# Patient Record
Sex: MALE | Race: WHITE | NOT HISPANIC OR LATINO | Employment: OTHER | URBAN - METROPOLITAN AREA
[De-identification: names, ages, dates, MRNs, and addresses within clinical notes are randomized per-mention and may not be internally consistent; named-entity substitution may affect disease eponyms.]

---

## 2021-12-14 ENCOUNTER — OFFICE VISIT (OUTPATIENT)
Dept: URGENT CARE | Facility: CLINIC | Age: 61
End: 2021-12-14
Payer: MEDICARE

## 2021-12-14 VITALS
SYSTOLIC BLOOD PRESSURE: 124 MMHG | TEMPERATURE: 98.2 F | BODY MASS INDEX: 31.5 KG/M2 | DIASTOLIC BLOOD PRESSURE: 84 MMHG | HEIGHT: 70 IN | HEART RATE: 78 BPM | WEIGHT: 220 LBS | RESPIRATION RATE: 18 BRPM | OXYGEN SATURATION: 98 %

## 2021-12-14 DIAGNOSIS — K04.7 DENTAL ABSCESS: Primary | ICD-10-CM

## 2021-12-14 PROCEDURE — 99213 OFFICE O/P EST LOW 20 MIN: CPT | Performed by: PHYSICIAN ASSISTANT

## 2021-12-14 PROCEDURE — G0463 HOSPITAL OUTPT CLINIC VISIT: HCPCS | Performed by: PHYSICIAN ASSISTANT

## 2021-12-14 RX ORDER — AMOXICILLIN AND CLAVULANATE POTASSIUM 875; 125 MG/1; MG/1
1 TABLET, FILM COATED ORAL EVERY 12 HOURS SCHEDULED
Qty: 14 TABLET | Refills: 0 | Status: SHIPPED | OUTPATIENT
Start: 2021-12-14 | End: 2021-12-21

## 2021-12-14 RX ORDER — IPRATROPIUM BROMIDE 17 UG/1
AEROSOL, METERED RESPIRATORY (INHALATION)
COMMUNITY
Start: 2021-10-28

## 2021-12-14 RX ORDER — ALBUTEROL SULFATE 90 UG/1
AEROSOL, METERED RESPIRATORY (INHALATION)
COMMUNITY
Start: 2021-10-27

## 2022-06-30 ENCOUNTER — OFFICE VISIT (OUTPATIENT)
Dept: GASTROENTEROLOGY | Facility: CLINIC | Age: 62
End: 2022-06-30
Payer: MEDICARE

## 2022-06-30 ENCOUNTER — TELEPHONE (OUTPATIENT)
Dept: GASTROENTEROLOGY | Facility: CLINIC | Age: 62
End: 2022-06-30

## 2022-06-30 VITALS
BODY MASS INDEX: 28.15 KG/M2 | DIASTOLIC BLOOD PRESSURE: 70 MMHG | WEIGHT: 196.6 LBS | SYSTOLIC BLOOD PRESSURE: 118 MMHG | HEIGHT: 70 IN | HEART RATE: 90 BPM

## 2022-06-30 DIAGNOSIS — Z80.0 FAMILY HISTORY OF COLON CANCER: Primary | ICD-10-CM

## 2022-06-30 DIAGNOSIS — R63.4 UNINTENTIONAL WEIGHT LOSS: ICD-10-CM

## 2022-06-30 DIAGNOSIS — K62.5 RECTAL BLEEDING: ICD-10-CM

## 2022-06-30 DIAGNOSIS — Z12.11 SCREENING FOR COLON CANCER: ICD-10-CM

## 2022-06-30 PROCEDURE — 99203 OFFICE O/P NEW LOW 30 MIN: CPT | Performed by: NURSE PRACTITIONER

## 2022-06-30 RX ORDER — POLYETHYLENE GLYCOL 3350, SODIUM SULFATE ANHYDROUS, SODIUM BICARBONATE, SODIUM CHLORIDE, POTASSIUM CHLORIDE 236; 22.74; 6.74; 5.86; 2.97 G/4L; G/4L; G/4L; G/4L; G/4L
4000 POWDER, FOR SOLUTION ORAL ONCE
Qty: 4000 ML | Refills: 0 | Status: SHIPPED | OUTPATIENT
Start: 2022-06-30 | End: 2022-06-30

## 2022-06-30 NOTE — PROGRESS NOTES
[facility]  Bob Webber 63 y o  male MRN: [id number]  Encounter: [id number]    ASSESSMENT AND PLAN:      1  Rectal bleeding  2  Hemorrhoids  Patient notes he has had rectal bleeding  He does have a history of hemorrhoids noted on last colonoscopy as well  Patient states he has noted increased blood to the toilet tissue after bowel movement  He denies any overt GI bleeding however does state he has had melena noted twice in the last 6 months  Consider hemorrhoid, microscopic colitis, malignancy  - CBC and differential; Future  - Colonoscopy schedule colonoscopy at [facility])    3  Unintentional weight loss  Patient thought he had been losing some weight recently however when patient weight in noted that his last office weight was 220 lb and his current office weight was 196 lb  He states he is not trying to lose weight and feels his appetite has not changed, he just does not eat as much  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  Consider malabsorption, malnutrition, hypothyroidism, malignancy    - Comprehensive metabolic panel  - TSH, 3rd generation; Future  - Colonoscopy/EGD at [facility])    4  Screening for colon cancer  5  Family history of colon cancer  Colonoscopy 2017; 5 year recall  Patient states his father had colon cancer     - Colonoscopy scheduled at [facility])  - polyethylene glycol (Golytely) 4000 mL solution; Take 4,000 mL by mouth once for 1 dose Take 4000 mL by mouth once for 1 dose   Use as directed  Dispense: 4000 mL; Refill: 0      Followup Appointment:  3 months  ______________________________________________________________________    Chief Complaint   [facility]   [facility]     Lower left abdominal discomfort (that has now gone away); rectal bleeding when he wipes; thinks he's due for a colon; overly fatigued        HPI:   Bob Webber is a 63y o  year old male with past medical history of MS and COVID accompanied by his daughter presents with rectal bleeding and unintentional weight loss  Patient states that he does realize he has hemorrhoids from previous colonoscopy and states that he had increased bleeding to the toilet tissue and has increased fatigue  Patient also states that he has had unintentional weight loss since last office visit in December  Per EMR patient has lost 24 lb over 6 months  Patient states he does not eat as much however does not feel he has a decreased appetite  Denies nausea, vomiting or abdominal pain  Denies any acid reflux symptoms  States he is having daily normal bowel movements  States he has had at least 2 episodes of melena over the past 6 months  Admits to 1 pack a day smoker, occasional ETOH  Colonoscopy 07/2017 noted 3 polyps, diverticula and diverticulosis, internal hemorrhoid  Patient has not had any recent blood work  Historical Information   Past Medical History:   Diagnosis Date    COVID 10/2021    Multiple sclerosis [facility])      Past Surgical History:   Procedure Laterality Date    COLONOSCOPY       Social History     Substance and Sexual Activity   Alcohol Use Not Currently     Social History     Substance and Sexual Activity   Drug Use Never     Social History     Tobacco Use   Smoking Status Current Every Day Smoker    Packs/day: 1 00    Types: Cigarettes   Smokeless Tobacco Never Used     Family History   Problem Relation Age of Onset    Colon cancer Father        Meds/Allergies     Current Outpatient Medications:     polyethylene glycol (Golytely) 4000 mL solution    albuterol (PROVENTIL HFA,VENTOLIN HFA) 90 mcg/act inhaler    Atrovent HFA 17 MCG/ACT inhaler    No Known Allergies    PHYSICAL EXAM:    Blood pressure 118/70, pulse 90, height 5' 10" (1 778 m), weight 89 2 kg (196 lb 9 6 oz)  Body mass index is 28 21 kg/m²       Normal exam    General Appearance: NAD, cooperative, alert  Eyes: Anicteric, PERRLA, EOMI  ENT:  Normocephalic, atraumatic, normal mucosa  Neck:  Supple, symmetrical, trachea midline,   Resp:  Clear to auscultation bilaterally; no rales, rhonchi or wheezing; respirations unlabored   CV:  S1 S2, Regular rate and rhythm; no murmur, rub, or gallop  GI:  Soft, non-tender, non-distended; normal bowel sounds; no masses, no organomegaly   Rectal: Deferred  Musculoskeletal: No cyanosis, clubbing or edema  Normal ROM  Skin:  No jaundice, rashes, or lesions   Heme/Lymph: No palpable cervical lymphadenopathy  Psych: Normal affect, good eye contact  Neuro: No gross deficits, AAOx3    Lab Results:   No results found for: WBC, HGB, HCT, MCV, PLT  No results found for: NA, K, CL, CO2, ANIONGAP, BUN, CREATININE, GLUCOSE, GLUF, CALCIUM, CORRECTEDCA, AST, ALT, ALKPHOS, PROT, BILITOT, EGFR  No results found for: IRON, TIBC, FERRITIN  No results found for: LIPASE    Radiology Results:   No results found  REVIEW OF SYSTEMS:    CONSTITUTIONAL: Denies any fever, chills, rigors, and weight loss  HEENT: No earache or tinnitus  Denies hearing loss or visual disturbances  CARDIOVASCULAR: No chest pain or palpitations  RESPIRATORY: Denies any cough, hemoptysis, shortness of breath or dyspnea on exertion  GASTROINTESTINAL: As noted in the History of Present Illness  GENITOURINARY: No problems with urination  Denies any hematuria or dysuria  NEUROLOGIC: No dizziness or vertigo, denies headaches  MUSCULOSKELETAL: Denies any muscle or joint pain  SKIN: Denies skin rashes or itching  ENDOCRINE: Denies excessive thirst  Denies intolerance to heat or cold  PSYCHOSOCIAL: Denies depression or anxiety  Denies any recent memory loss

## 2022-06-30 NOTE — TELEPHONE ENCOUNTER
Scheduled date of colonoscopy (as of today): 8/17/22  Physician performing colonoscopy: Dr Charles Cronin  Location of colonoscopy: [facility]  Bowel prep reviewed with patient: Golytely   Instructions reviewed with patient by: Anda Runner  Clearances: no

## 2022-07-26 LAB
ALBUMIN SERPL-MCNC: 4.7 G/DL (ref 3.8–4.9)
ALBUMIN/GLOB SERPL: 2 {RATIO} (ref 1.2–2.2)
ALP SERPL-CCNC: 44 IU/L (ref 44–121)
ALT SERPL-CCNC: 17 IU/L (ref 0–44)
AST SERPL-CCNC: 15 IU/L (ref 0–40)
BASOPHILS # BLD AUTO: 0.1 X10E3/UL (ref 0–0.2)
BASOPHILS NFR BLD AUTO: 1 %
BILIRUB SERPL-MCNC: 0.5 MG/DL (ref 0–1.2)
BUN SERPL-MCNC: 11 MG/DL (ref 8–27)
BUN/CREAT SERPL: 11 (ref 10–24)
CALCIUM SERPL-MCNC: 9.6 MG/DL (ref 8.6–10.2)
CHLORIDE SERPL-SCNC: 102 MMOL/L (ref 96–106)
CO2 SERPL-SCNC: 24 MMOL/L (ref 20–29)
CREAT SERPL-MCNC: 0.97 MG/DL (ref 0.76–1.27)
EGFR: 89 ML/MIN/1.73
EOSINOPHIL # BLD AUTO: 0.2 X10E3/UL (ref 0–0.4)
EOSINOPHIL NFR BLD AUTO: 2 %
ERYTHROCYTE [DISTWIDTH] IN BLOOD BY AUTOMATED COUNT: 14.5 % (ref 11.6–15.4)
GLOBULIN SER-MCNC: 2.4 G/DL (ref 1.5–4.5)
GLUCOSE SERPL-MCNC: 96 MG/DL (ref 65–99)
HCT VFR BLD AUTO: 44.4 % (ref 37.5–51)
HGB BLD-MCNC: 15.5 G/DL (ref 13–17.7)
IMM GRANULOCYTES # BLD: 0.1 X10E3/UL (ref 0–0.1)
IMM GRANULOCYTES NFR BLD: 1 %
LYMPHOCYTES # BLD AUTO: 2.3 X10E3/UL (ref 0.7–3.1)
LYMPHOCYTES NFR BLD AUTO: 23 %
MCH RBC QN AUTO: 30.2 PG (ref 26.6–33)
MCHC RBC AUTO-ENTMCNC: 34.9 G/DL (ref 31.5–35.7)
MCV RBC AUTO: 86 FL (ref 79–97)
MONOCYTES # BLD AUTO: 0.6 X10E3/UL (ref 0.1–0.9)
MONOCYTES NFR BLD AUTO: 6 %
NEUTROPHILS # BLD AUTO: 6.8 X10E3/UL (ref 1.4–7)
NEUTROPHILS NFR BLD AUTO: 67 %
PLATELET # BLD AUTO: 329 X10E3/UL (ref 150–450)
POTASSIUM SERPL-SCNC: 4.6 MMOL/L (ref 3.5–5.2)
PROT SERPL-MCNC: 7.1 G/DL (ref 6–8.5)
RBC # BLD AUTO: 5.14 X10E6/UL (ref 4.14–5.8)
SODIUM SERPL-SCNC: 137 MMOL/L (ref 134–144)
TSH SERPL DL<=0.005 MIU/L-ACNC: 1.42 UIU/ML (ref 0.45–4.5)
WBC # BLD AUTO: 10 X10E3/UL (ref 3.4–10.8)

## 2022-08-09 ENCOUNTER — TELEPHONE (OUTPATIENT)
Dept: GASTROENTEROLOGY | Facility: CLINIC | Age: 62
End: 2022-08-09

## 2022-08-09 NOTE — TELEPHONE ENCOUNTER
Left message for patient on phone number available in the chart  Patient does not identify himself on the voicemail  Left message that lab work that was completed from last office visit was normal   Patient does have an EGD colonoscopy coming up on 08/17  Requested he call the office for me or one of the staff if he has any questions or concerns regarding the lab work or upcoming procedures

## 2022-08-15 ENCOUNTER — TELEPHONE (OUTPATIENT)
Dept: GASTROENTEROLOGY | Facility: AMBULATORY SURGERY CENTER | Age: 62
End: 2022-08-15

## 2022-08-17 ENCOUNTER — HOSPITAL ENCOUNTER (OUTPATIENT)
Dept: GASTROENTEROLOGY | Facility: AMBULATORY SURGERY CENTER | Age: 62
Discharge: HOME | End: 2022-08-17
Payer: MEDICARE

## 2022-08-17 ENCOUNTER — ANESTHESIA (OUTPATIENT)
Dept: GASTROENTEROLOGY | Facility: AMBULATORY SURGERY CENTER | Age: 62
End: 2022-08-17

## 2022-08-17 ENCOUNTER — ANESTHESIA EVENT (OUTPATIENT)
Dept: GASTROENTEROLOGY | Facility: AMBULATORY SURGERY CENTER | Age: 62
End: 2022-08-17

## 2022-08-17 VITALS
WEIGHT: 192 LBS | OXYGEN SATURATION: 97 % | TEMPERATURE: 98.3 F | RESPIRATION RATE: 15 BRPM | HEART RATE: 69 BPM | SYSTOLIC BLOOD PRESSURE: 162 MMHG | BODY MASS INDEX: 27.49 KG/M2 | HEIGHT: 70 IN | DIASTOLIC BLOOD PRESSURE: 95 MMHG

## 2022-08-17 DIAGNOSIS — R63.4 UNINTENTIONAL WEIGHT LOSS: ICD-10-CM

## 2022-08-17 DIAGNOSIS — Z12.11 SCREENING FOR COLON CANCER: ICD-10-CM

## 2022-08-17 DIAGNOSIS — Z80.0 FAMILY HISTORY OF COLON CANCER: ICD-10-CM

## 2022-08-17 DIAGNOSIS — K62.5 RECTAL BLEEDING: ICD-10-CM

## 2022-08-17 PROCEDURE — 43239 EGD BIOPSY SINGLE/MULTIPLE: CPT | Performed by: INTERNAL MEDICINE

## 2022-08-17 PROCEDURE — 45380 COLONOSCOPY AND BIOPSY: CPT | Performed by: INTERNAL MEDICINE

## 2022-08-17 PROCEDURE — 88305 TISSUE EXAM BY PATHOLOGIST: CPT | Performed by: PATHOLOGY

## 2022-08-17 RX ORDER — LIDOCAINE HYDROCHLORIDE 10 MG/ML
INJECTION, SOLUTION EPIDURAL; INFILTRATION; INTRACAUDAL; PERINEURAL AS NEEDED
Status: DISCONTINUED | OUTPATIENT
Start: 2022-08-17 | End: 2022-08-17

## 2022-08-17 RX ORDER — PROPOFOL 10 MG/ML
INJECTION, EMULSION INTRAVENOUS AS NEEDED
Status: DISCONTINUED | OUTPATIENT
Start: 2022-08-17 | End: 2022-08-17

## 2022-08-17 RX ORDER — SODIUM CHLORIDE, SODIUM LACTATE, POTASSIUM CHLORIDE, CALCIUM CHLORIDE 600; 310; 30; 20 MG/100ML; MG/100ML; MG/100ML; MG/100ML
50 INJECTION, SOLUTION INTRAVENOUS CONTINUOUS
Status: DISCONTINUED | OUTPATIENT
Start: 2022-08-17 | End: 2022-08-17

## 2022-08-17 RX ADMIN — LIDOCAINE HYDROCHLORIDE 50 MG: 10 INJECTION, SOLUTION EPIDURAL; INFILTRATION; INTRACAUDAL; PERINEURAL at 14:32

## 2022-08-17 RX ADMIN — PROPOFOL 20 MG: 10 INJECTION, EMULSION INTRAVENOUS at 14:44

## 2022-08-17 RX ADMIN — PROPOFOL 20 MG: 10 INJECTION, EMULSION INTRAVENOUS at 14:47

## 2022-08-17 RX ADMIN — SODIUM CHLORIDE, SODIUM LACTATE, POTASSIUM CHLORIDE, CALCIUM CHLORIDE 50 ML/HR: 600; 310; 30; 20 INJECTION, SOLUTION INTRAVENOUS at 13:54

## 2022-08-17 RX ADMIN — PROPOFOL 20 MG: 10 INJECTION, EMULSION INTRAVENOUS at 14:51

## 2022-08-17 RX ADMIN — PROPOFOL 20 MG: 10 INJECTION, EMULSION INTRAVENOUS at 14:40

## 2022-08-17 RX ADMIN — PROPOFOL 20 MG: 10 INJECTION, EMULSION INTRAVENOUS at 14:56

## 2022-08-17 RX ADMIN — PROPOFOL 50 MG: 10 INJECTION, EMULSION INTRAVENOUS at 14:35

## 2022-08-17 RX ADMIN — PROPOFOL 20 MG: 10 INJECTION, EMULSION INTRAVENOUS at 15:02

## 2022-08-17 RX ADMIN — PROPOFOL 150 MG: 10 INJECTION, EMULSION INTRAVENOUS at 14:32

## 2022-08-17 NOTE — ANESTHESIA POSTPROCEDURE EVALUATION
Post-Op Assessment Note    CV Status:  Stable  Pain Score: 0    Pain management: adequate     Mental Status:  Arousable   Hydration Status:  Euvolemic   PONV Controlled:  None   Airway Patency:  Patent      Post Op Vitals Reviewed: Yes      Staff: Anesthesiologist, CRNA   Comments: reort given to RN; VSS; RA        No complications documented      /92 (08/17/22 1509)    Temp      Pulse 69 (08/17/22 1509)   Resp 12 (08/17/22 1509)    SpO2 95 % (08/17/22 1509)

## 2022-08-17 NOTE — ANESTHESIA PREPROCEDURE EVALUATION
Procedure:  COLONOSCOPY  EGD    Relevant Problems   PULMONARY   (+) Smoker      Nervous and Auditory   (+) Multiple sclerosis (HCC)        Physical Exam    Airway    Mallampati score: II  TM Distance: >3 FB  Neck ROM: full     Dental   No notable dental hx     Cardiovascular      Pulmonary      Other Findings        Anesthesia Plan  ASA Score- 2     Anesthesia Type- IV sedation with anesthesia with ASA Monitors  Additional Monitors:   Airway Plan:           Plan Factors-    Chart reviewed  Patient summary reviewed  Patient is not a current smoker  Induction- intravenous  Postoperative Plan-     Informed Consent- Anesthetic plan and risks discussed with patient  I personally reviewed this patient with the CRNA  Discussed and agreed on the Anesthesia Plan with the CRNA  Yady Acuna

## 2022-08-17 NOTE — H&P
History and Physical - [facility]  Nitesh Rodríguez 63 y o  male MRN: [id number]    HPI: Nitesh Rodríguez is a 63y o  year old male who presents for EGD and colonoscopy to evaluate rectal bleeding, melena, unexplained weight loss, family history of colon cancer and personal history of adenomatous polyps  REVIEW OF SYSTEMS: Per the HPI, and otherwise unremarkable  Historical Information   Past Medical History:   Diagnosis Date    COVID 10/2021    Multiple sclerosis [facility])      Past Surgical History:   Procedure Laterality Date    COLONOSCOPY       Social History   Social History     Substance and Sexual Activity   Alcohol Use Not Currently     Social History     Substance and Sexual Activity   Drug Use Never     Social History     Tobacco Use   Smoking Status Current Every Day Smoker    Packs/day: 1 00    Types: Cigarettes   Smokeless Tobacco Never Used     Family History   Problem Relation Age of Onset    Colon cancer Father        Meds/Allergies       Current Outpatient Medications:     albuterol (PROVENTIL HFA,VENTOLIN HFA) 90 mcg/act inhaler    Atrovent HFA 17 MCG/ACT inhaler    polyethylene glycol (Golytely) 4000 mL solution    Current Facility-Administered Medications:     lactated ringers infusion, 50 mL/hr, Intravenous, Continuous, 50 mL/hr at 08/17/22 1354    No Known Allergies    Objective     /87   Pulse 80   Temp 98 3 °F (36 8 °C) (Temporal)   Resp 20   Ht 5' 10" (1 778 m)   Wt 87 1 kg (192 lb)   SpO2 98%   BMI 27 55 kg/m²     PHYSICAL EXAM    Gen: NAD AAOx3  Head: Normocephalic, Atraumatic  CV: S1S2 RRR no m/r/g  CHEST: Clear b/l no c/r/w  ABD: soft, +BS NT/ND  EXT: no edema    ASSESSMENT/PLAN:  This is a 63y o  year old male here for EGD and colonoscopy, and he is stable and optimized for his procedure

## 2022-08-30 NOTE — RESULT ENCOUNTER NOTE
Left message for patient     -Colon biopsies (thick fold and lipoma) all benign as expected  Repeat colonoscopy five years due to hx of polyps  -EGD biopsies also benign, no celiac, H pylori or cancer  Inflamed fold seen has intestinal metaplasia, possible increased cancer risk in future, but benign  Repeat EGD in one year to reassess given appearance and pathology, if negative repeat 3 years thereafter   -No change in recommendations of diet, rx and follow up

## 2022-10-11 ENCOUNTER — TELEPHONE (OUTPATIENT)
Dept: GASTROENTEROLOGY | Facility: CLINIC | Age: 62
End: 2022-10-11